# Patient Record
Sex: MALE | Race: WHITE | NOT HISPANIC OR LATINO | Employment: OTHER | URBAN - METROPOLITAN AREA
[De-identification: names, ages, dates, MRNs, and addresses within clinical notes are randomized per-mention and may not be internally consistent; named-entity substitution may affect disease eponyms.]

---

## 2023-03-17 ENCOUNTER — APPOINTMENT (OUTPATIENT)
Dept: MRI IMAGING | Facility: HOSPITAL | Age: 78
End: 2023-03-17
Payer: MEDICARE

## 2023-03-17 ENCOUNTER — HOSPITAL ENCOUNTER (EMERGENCY)
Facility: HOSPITAL | Age: 78
Discharge: HOME | End: 2023-03-17
Attending: EMERGENCY MEDICINE | Admitting: EMERGENCY MEDICINE
Payer: MEDICARE

## 2023-03-17 VITALS
OXYGEN SATURATION: 97 % | HEIGHT: 62 IN | SYSTOLIC BLOOD PRESSURE: 140 MMHG | BODY MASS INDEX: 34.96 KG/M2 | DIASTOLIC BLOOD PRESSURE: 79 MMHG | TEMPERATURE: 98.2 F | HEART RATE: 102 BPM | RESPIRATION RATE: 16 BRPM | WEIGHT: 190 LBS

## 2023-03-17 DIAGNOSIS — M51.36 DDD (DEGENERATIVE DISC DISEASE), LUMBAR: ICD-10-CM

## 2023-03-17 DIAGNOSIS — M54.32 BILATERAL SCIATICA: Primary | ICD-10-CM

## 2023-03-17 DIAGNOSIS — I71.43 INFRARENAL ABDOMINAL AORTIC ANEURYSM (AAA) WITHOUT RUPTURE: ICD-10-CM

## 2023-03-17 DIAGNOSIS — M51.36 BULGING OF INTERVERTEBRAL DISC BETWEEN L4 AND L5: ICD-10-CM

## 2023-03-17 DIAGNOSIS — M54.31 BILATERAL SCIATICA: Primary | ICD-10-CM

## 2023-03-17 DIAGNOSIS — N28.1 CYST OF RIGHT KIDNEY: ICD-10-CM

## 2023-03-17 PROCEDURE — 96372 THER/PROPH/DIAG INJ SC/IM: CPT

## 2023-03-17 PROCEDURE — 99283 EMERGENCY DEPT VISIT LOW MDM: CPT

## 2023-03-17 PROCEDURE — 72148 MRI LUMBAR SPINE W/O DYE: CPT

## 2023-03-17 PROCEDURE — 25010000002 KETOROLAC TROMETHAMINE PER 15 MG: Performed by: NURSE PRACTITIONER

## 2023-03-17 RX ORDER — HYDROCODONE BITARTRATE AND ACETAMINOPHEN 5; 325 MG/1; MG/1
1 TABLET ORAL ONCE
Status: COMPLETED | OUTPATIENT
Start: 2023-03-17 | End: 2023-03-17

## 2023-03-17 RX ORDER — OXYCODONE HYDROCHLORIDE AND ACETAMINOPHEN 5; 325 MG/1; MG/1
1 TABLET ORAL EVERY 8 HOURS PRN
Qty: 9 TABLET | Refills: 0 | Status: SHIPPED | OUTPATIENT
Start: 2023-03-17 | End: 2023-03-20

## 2023-03-17 RX ORDER — KETOROLAC TROMETHAMINE 30 MG/ML
30 INJECTION, SOLUTION INTRAMUSCULAR; INTRAVENOUS ONCE
Status: COMPLETED | OUTPATIENT
Start: 2023-03-17 | End: 2023-03-17

## 2023-03-17 RX ADMIN — KETOROLAC TROMETHAMINE 30 MG: 30 INJECTION, SOLUTION INTRAMUSCULAR; INTRAVENOUS at 17:14

## 2023-03-17 RX ADMIN — HYDROCODONE BITARTRATE AND ACETAMINOPHEN 1 TABLET: 5; 325 TABLET ORAL at 17:14

## 2023-03-17 NOTE — DISCHARGE INSTRUCTIONS
Keep your appointment as scheduled for Tuesday.    Incidental finding on MRI is a right renal cyst.  Radiology recommends a CT scan of the right kidney to further evaluate the cyst.  Contact her PCP for further imaging.

## 2023-03-17 NOTE — ED PROVIDER NOTES
Subjective   History of Present Illness  Edward Bean is a 77 yr old male that presents emergency department for complaints of low back pain.  Patient reports that he woke up on February 24 with pain in his low back.  Patient denies any injury.  Patient reports that the pain has increased.  Pain is radiating down both legs.  He reports intermittent episodes of numbness.  Patient has been walking with a walker.  Daughter is currently with patient.  She advises that he has an appointment to see Dr. Salinas at [facility] on Tuesday.  Patient was due to have an MRI to take to his appointment however no one has contacted him about getting this imaging done.  Patient explains he is to have injections at this point.    History provided by:  Patient   used: No    Back Pain  Location:  Lumbar spine  Quality:  Stabbing and shooting  Radiates to:  L posterior upper leg and R posterior upper leg  Pain severity:  Moderate  Duration:  1 month  Timing:  Constant  Progression:  Worsening  Associated symptoms: tingling    Associated symptoms: no bladder incontinence, no bowel incontinence, no chest pain, no numbness and no paresthesias        Review of Systems   Respiratory: Negative for shortness of breath.    Cardiovascular: Negative for chest pain.   Gastrointestinal: Negative for bowel incontinence.   Genitourinary: Negative for bladder incontinence and difficulty urinating.   Musculoskeletal: Positive for back pain.   Neurological: Positive for tingling. Negative for numbness and paresthesias.   All other systems reviewed and are negative.      History reviewed. No pertinent past medical history.    No Known Allergies    History reviewed. No pertinent surgical history.    History reviewed. No pertinent family history.    Social History     Socioeconomic History   • Marital status:            Objective   Physical Exam  Vitals and nursing note reviewed.   Constitutional:       Appearance:  Normal appearance. He is well-developed. He is not toxic-appearing.   HENT:      Head: Normocephalic and atraumatic.   Eyes:      General: Lids are normal.      Conjunctiva/sclera: Conjunctivae normal.   Neck:      Trachea: Trachea normal.   Cardiovascular:      Rate and Rhythm: Regular rhythm.      Pulses: Normal pulses.      Heart sounds: Normal heart sounds.   Pulmonary:      Effort: Pulmonary effort is normal. No respiratory distress.      Breath sounds: Normal breath sounds. No decreased breath sounds, wheezing, rhonchi or rales.   Abdominal:      General: Bowel sounds are normal.      Palpations: Abdomen is soft.      Tenderness: There is no abdominal tenderness.   Musculoskeletal:         General: Normal range of motion.      Cervical back: Normal, full passive range of motion without pain and normal range of motion.      Thoracic back: Normal.      Lumbar back: Tenderness present. Normal range of motion.   Skin:     General: Skin is warm and dry.      Findings: No rash.   Neurological:      Mental Status: He is alert and oriented to person, place, and time.      Cranial Nerves: No cranial nerve deficit.   Psychiatric:         Speech: Speech normal.         Behavior: Behavior normal. Behavior is cooperative.         Procedures           ED Course  ED Course as of 03/19/23 1132   Fri Mar 17, 2023   1445 Edward Bean is a 77 yr old male that presents emergency department for complaints of low back pain.  Patient reports that he woke up on February 24 with pain in his low back.  Patient denies any injury.  Patient reports that the pain has increased.  Pain is radiating down both legs.  He reports intermittent episodes of numbness.  Patient has been walking with a walker.  Daughter is currently with patient.  She advises that he has an appointment to see Dr. Salinas at [facility] on Tuesday.  Patient was due to have an MRI to take to his appointment however no one has contacted him about getting this  "imaging done.  Patient explains he is to have injections at this point.  DIfferential DX: DDD, herniated disc, sciatica, spinal canal stenosis. [KG]   1700 Imaging reviewed.  Patient has degenerative disc lumbar spine.  Incidental findings include a renal cyst and infrarenal abdominal aortic aneurysm.  Patient encouraged to follow-up with neurosurgery.  Patient to follow-up with pain management as planned.  Patient to see his PCP for further evaluation. [KG]   1800 Patient will be discharged home.  Patient to take pain meds as ordered.  Patient to keep appointment for Tuesday as scheduled.  Patient agrees and verbalized understanding. [KG]      ED Course User Index  [KG] Glenis Bennett, APRN           No results found for this or any previous visit (from the past 24 hour(s)).  Note: In addition to lab results from this visit, the labs listed above may include labs taken at another facility or during a different encounter within the last 24 hours. Please correlate lab times with ED admission and discharge times for further clarification of the services performed during this visit.    MRI Lumbar Spine Without Contrast   Final Result   Impression:    1.Multilevel degenerative changes lumbar spine worse at L4-5.   2.Lesion along the peripheral aspect of the midpole right kidney that may reflect cyst difficult to completely confirm on this exam. A nonemergent follow-up MR could be obtained of the kidneys with and without contrast to reassess or nonemergent CT.   3.Infrarenal abdominal aortic aneurysm.      Electronically Signed: Jaiden Kumari     3/17/2023 4:48 PM EDT     Workstation ID: [id number]        Vitals:    03/17/23 1243   BP: 140/79   BP Location: Left arm   Patient Position: Sitting   Pulse: 102   Resp: 16   Temp: 98.2 °F (36.8 °C)   TempSrc: Oral   SpO2: 97%   Weight: 86.2 kg (190 lb)   Height: 157.5 cm (62\")     Medications   ketorolac (TORADOL) injection 30 mg (30 mg Intramuscular Given 3/17/23 1124) "   HYDROcodone-acetaminophen (NORCO) 5-325 MG per tablet 1 tablet (1 tablet Oral Given 3/17/23 1714)     ECG/EMG Results (last 24 hours)     ** No results found for the last 24 hours. **        No orders to display                                       MDM    Final diagnoses:   Bilateral sciatica   DDD (degenerative disc disease), lumbar   Bulging of intervertebral disc between L4 and L5   Cyst of right kidney   Infrarenal abdominal aortic aneurysm (AAA) without rupture       ED Disposition  ED Disposition     ED Disposition   Discharge    Condition   Stable    Comment   --             Trip Lira MD  [address] [facility] [address] [phone]          Delvis Silva MD  [address] [place] [id number] [phone]    On 3/21/2023           Medication List      New Prescriptions    oxyCODONE-acetaminophen 5-325 MG per tablet  Commonly known as: PERCOCET  Take 1 tablet by mouth Every 8 (Eight) Hours As Needed for Moderate Pain for up to 3 days.           Where to Get Your Medications      These medications were sent to [facility] [address] [place] [address] - [address] [place] [address] - [phone]  - [phone] FX  [address] [facility] [address]    Phone: [phone]   · oxyCODONE-acetaminophen 5-325 MG per tablet          Glenis Bennett, APRN  03/19/23 1136